# Patient Record
Sex: FEMALE | Race: BLACK OR AFRICAN AMERICAN | Employment: OTHER | URBAN - METROPOLITAN AREA
[De-identification: names, ages, dates, MRNs, and addresses within clinical notes are randomized per-mention and may not be internally consistent; named-entity substitution may affect disease eponyms.]

---

## 2019-08-09 ENCOUNTER — HOSPITAL ENCOUNTER (EMERGENCY)
Age: 34
Discharge: HOME | End: 2019-08-09
Attending: EMERGENCY MEDICINE | Admitting: EMERGENCY MEDICINE
Payer: COMMERCIAL

## 2019-08-09 VITALS
TEMPERATURE: 98.2 F | HEART RATE: 85 BPM | WEIGHT: 142 LBS | RESPIRATION RATE: 16 BRPM | BODY MASS INDEX: 26.13 KG/M2 | OXYGEN SATURATION: 100 % | DIASTOLIC BLOOD PRESSURE: 81 MMHG | HEIGHT: 62 IN | SYSTOLIC BLOOD PRESSURE: 111 MMHG

## 2019-08-09 DIAGNOSIS — R10.9 ABDOMINAL PAIN, UNSPECIFIED ABDOMINAL LOCATION: Primary | ICD-10-CM

## 2019-08-09 LAB
APPEARANCE UR: CLEAR
BILIRUB UR QL: NEGATIVE
COLOR UR: YELLOW
GLUCOSE UR STRIP.AUTO-MCNC: NEGATIVE MG/DL
HCG UR QL: NEGATIVE
HGB UR QL STRIP: NEGATIVE
KETONES UR QL STRIP.AUTO: NEGATIVE MG/DL
LEUKOCYTE ESTERASE UR QL STRIP.AUTO: NEGATIVE
NITRITE UR QL STRIP.AUTO: NEGATIVE
PH UR STRIP: 5.5 [PH] (ref 5–8)
PROT UR STRIP-MCNC: NEGATIVE MG/DL
SP GR UR REFRACTOMETRY: 1.02 (ref 1–1.03)
UROBILINOGEN UR QL STRIP.AUTO: 0.2 EU/DL (ref 0.2–1)

## 2019-08-09 PROCEDURE — 81003 URINALYSIS AUTO W/O SCOPE: CPT

## 2019-08-09 PROCEDURE — 99284 EMERGENCY DEPT VISIT MOD MDM: CPT

## 2019-08-09 PROCEDURE — 81025 URINE PREGNANCY TEST: CPT

## 2019-08-09 RX ORDER — OXYCODONE AND ACETAMINOPHEN 5; 325 MG/1; MG/1
1 TABLET ORAL
COMMUNITY

## 2019-08-09 RX ORDER — DOCUSATE SODIUM 100 MG/1
100 CAPSULE, LIQUID FILLED ORAL 2 TIMES DAILY
COMMUNITY

## 2019-08-09 RX ORDER — CITALOPRAM 10 MG/1
10 TABLET ORAL DAILY
COMMUNITY

## 2019-08-09 RX ORDER — LORAZEPAM 0.5 MG/1
0.5 TABLET ORAL
COMMUNITY

## 2019-08-09 NOTE — ED NOTES
Report given to Benji Hansen [facility]. 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

## 2019-08-09 NOTE — ED TRIAGE NOTES
Patient arrived via EMS. EMS states that patient was screaming hysterically on the scene to the point that she could be heard from the street. Patient complaining of ABD pain and begging for her \"daddy\". Patient slept through EMS transport to the ER. Upon arrival to the ER patient began begging for her daddy again and arrived in tears. Once patient father arrived in the ER patient immediately stopped crying and was smiling and laughing with no apparent distress.

## 2019-08-09 NOTE — ED PROVIDER NOTES
EMERGENCY DEPARTMENT HISTORY AND PHYSICAL EXAM    2:03 AM      Date: 8/9/2019  Patient Name: Layne Rodriguez    History of Presenting Illness     Chief Complaint   Patient presents with    Abdominal Pain         HISTORY: Layne Rodriguez is a 27 y.o. female with medical history as listed below who presents with episode of lower abdominal pain. Patient woke up from her sleep to urinate. Afterwards she had a sharp pain in her lower abdomen. EMS was called. However during transport the pain resolved and has not returned. She denies nausea, vomiting, diarrhea, constipation, radiation of her pain elsewhere, dysuria, hematuria, vaginal discharge. Currently denies fever, chest pain, difficulty breathing. PCP: Kevin Nowak MD    Current Outpatient Medications   Medication Sig Dispense Refill    LORazepam (ATIVAN) 0.5 mg tablet Take 0.5 mg by mouth every four (4) hours as needed for Anxiety.  docusate sodium (DOK) 100 mg capsule Take 100 mg by mouth two (2) times a day.  oxyCODONE-acetaminophen (PERCOCET) 5-325 mg per tablet Take 1 Tab by mouth every four (4) hours as needed for Pain.  citalopram (CELEXA) 10 mg tablet Take 10 mg by mouth daily.  amitriptyline (ELAVIL) 25 mg tablet Take  by mouth nightly.  escitalopram oxalate (LEXAPRO) 10 mg tablet Take 10 mg by mouth daily.  ondansetron (ZOFRAN ODT) 4 mg disintegrating tablet Take 4 mg by mouth every eight (8) hours as needed for Nausea. Past History     Past Medical History:  Past Medical History:   Diagnosis Date    Cerebral AVM 2001    Right temporoparietal craniotomy in relation to AVM resection    Depression 7/7/2016       Past Surgical History:  Past Surgical History:   Procedure Laterality Date    HX CRANIOTOMY      Resection       Family History:  History reviewed. No pertinent family history. Social History:  Social History     Tobacco Use    Smoking status: Never Smoker   Substance Use Topics    Alcohol use:  No Alcohol/week: 0.0 standard drinks    Drug use: Not on file       Allergies:  No Known Allergies      Review of Systems       Review of Systems   Constitutional: Negative for chills. HENT: Negative for congestion and sore throat. Respiratory: Negative for cough and shortness of breath. Cardiovascular: Negative for chest pain. Gastrointestinal: Positive for abdominal pain (resolved  ). Negative for diarrhea, nausea and vomiting. Genitourinary: Negative for dysuria. Musculoskeletal: Negative for back pain. Skin: Negative for rash. Neurological: Negative for dizziness and headaches. All other systems reviewed and are negative. Physical Exam     Visit Vitals  /81   Pulse 85   Temp 98.2 °F (36.8 °C)   Resp 16   Ht 5' 2\" (1.575 m)   Wt 64.4 kg (142 lb)   SpO2 100%   BMI 25.97 kg/m²       Physical Exam  General Exam: Patient is a well developed and well nourished in no distress. Patient does not appear acutely ill or toxic. Eye Exam: Lids and conjunctiva are normal  ENT Exam: The general head and facial exam is normal.  The neck is supple without meningeal signs. No significant adenopathy. Pulmonary Exam: No respiratory distress. The respiratory rate is normal.  No stridor. The breath sounds are equal bilaterally. There are no wheezes, rales, or rhonchi noted. Cardiac Exam: The cardiac rate and rhythm are normal.  No significant murmurs, rubs, or gallops. The peripheral pulses of the upper extremities are normal.  Abdominal Exam: Abdomen is soft and non-distended. No pulsatile masses. There is no local tenderness. There is no rebound or guarding noted. Skin and Soft Tissue: The skin is warm and dry, without significant abnormality. Good color. Musculoskeletal Exam: No peripheral edema. The musculoskeletal exam of the lower extremities is normal without significant local tenderness. Psychiatric: Normal adult with appropriate demeanor and interpersonal interaction.   Is oriented to person, place, and time. Diagnostic Study Results     Labs -  Recent Results (from the past 12 hour(s))   URINALYSIS W/ RFLX MICROSCOPIC    Collection Time: 08/09/19  1:30 AM   Result Value Ref Range    Color YELLOW      Appearance CLEAR      Specific gravity 1.018 1.005 - 1.030      pH (UA) 5.5 5.0 - 8.0      Protein NEGATIVE  NEG mg/dL    Glucose NEGATIVE  NEG mg/dL    Ketone NEGATIVE  NEG mg/dL    Bilirubin NEGATIVE  NEG      Blood NEGATIVE  NEG      Urobilinogen 0.2 0.2 - 1.0 EU/dL    Nitrites NEGATIVE  NEG      Leukocyte Esterase NEGATIVE  NEG     HCG URINE, QL    Collection Time: 08/09/19  1:30 AM   Result Value Ref Range    HCG urine, QL NEGATIVE  NEG         Radiologic Studies -   No orders to display         Medical Decision Making   I am the first provider for this patient. I reviewed the vital signs, available nursing notes, past medical history, past surgical history, family history and social history. Vital Signs-Reviewed the patient's vital signs. Records Reviewed: Nursing Notes (Time of Review: 2:03 AM)    ED Course: Progress Notes, Reevaluation, and Consults:      Provider Notes (Medical Decision Making): Patient with an episode of sharp lower abdominal pain after urinating this evening. Her pain resolved by the time she arrived to the emergency department. Her vitals are reassuring. Her abdominal exam do not think labs or imaging are necessary at this time as her symptoms have completely resolved. I do not suspect ovarian torsion or other acute pelvic pathology warranting emergent work-up. Her urine analysis is negative and she is not pregnant. Patient and her father also in agreement to hold off on any testing at this time as her symptoms have completely resolved. 0245AM: Patient remains without any symptoms, no abdominal tenderness on repeat exam.  Is agreeable to holding off on labs or any further testing at this time as her symptoms have resolved. Urinalysis is negative and urine pregnancy test is negative. Diagnosis     Clinical Impression:   1. Abdominal pain, unspecified abdominal location        Disposition: DC    Follow-up Information    None          Patient's Medications   Start Taking    No medications on file   Continue Taking    AMITRIPTYLINE (ELAVIL) 25 MG TABLET    Take  by mouth nightly. CITALOPRAM (CELEXA) 10 MG TABLET    Take 10 mg by mouth daily. DOCUSATE SODIUM (DOK) 100 MG CAPSULE    Take 100 mg by mouth two (2) times a day. ESCITALOPRAM OXALATE (LEXAPRO) 10 MG TABLET    Take 10 mg by mouth daily. LORAZEPAM (ATIVAN) 0.5 MG TABLET    Take 0.5 mg by mouth every four (4) hours as needed for Anxiety. ONDANSETRON (ZOFRAN ODT) 4 MG DISINTEGRATING TABLET    Take 4 mg by mouth every eight (8) hours as needed for Nausea. OXYCODONE-ACETAMINOPHEN (PERCOCET) 5-325 MG PER TABLET    Take 1 Tab by mouth every four (4) hours as needed for Pain. These Medications have changed    No medications on file   Stop Taking    No medications on file     _______________________________    Please note that this dictation was completed with [employer], the computer voice recognition software. Quite often unanticipated grammatical, syntax, homophones, and other interpretive errors are inadvertently transcribed by the computer software. Please disregard these errors. Please excuse any errors that have escaped final proofreading.

## 2019-08-09 NOTE — DISCHARGE INSTRUCTIONS

## 2019-08-09 NOTE — ED NOTES
Patient arriving with multiple complaints. Patient's c/c is sharp abd pain 10/10 one time post-voiding at home. Patient states this one jab of pain \"was so bad I thought I was going to die. \" Patient exhibiting decreased concentration, also complaining of daily fatigue, intermittent rash (though no rash currently) and \"one time I felt short of breath and thought I was going to die. \"